# Patient Record
Sex: FEMALE | Race: WHITE
[De-identification: names, ages, dates, MRNs, and addresses within clinical notes are randomized per-mention and may not be internally consistent; named-entity substitution may affect disease eponyms.]

---

## 2017-08-18 ENCOUNTER — HOSPITAL ENCOUNTER (OUTPATIENT)
Dept: HOSPITAL 45 - C.LABMFLN | Age: 82
Discharge: HOME | End: 2017-08-18
Attending: FAMILY MEDICINE
Payer: COMMERCIAL

## 2017-08-18 DIAGNOSIS — J45.909: Primary | ICD-10-CM

## 2017-08-18 DIAGNOSIS — E55.9: ICD-10-CM

## 2017-08-18 DIAGNOSIS — E78.5: ICD-10-CM

## 2017-08-18 DIAGNOSIS — E03.9: ICD-10-CM

## 2017-08-18 LAB
ALBUMIN/GLOB SERPL: 1.1 {RATIO} (ref 0.9–2)
ALP SERPL-CCNC: 57 U/L (ref 45–117)
ALT SERPL-CCNC: 17 U/L (ref 12–78)
ANION GAP SERPL CALC-SCNC: 5 MMOL/L (ref 3–11)
AST SERPL-CCNC: 10 U/L (ref 15–37)
BASOPHILS # BLD: 0.02 K/UL (ref 0–0.2)
BASOPHILS NFR BLD: 0.4 %
BUN SERPL-MCNC: 21 MG/DL (ref 7–18)
BUN/CREAT SERPL: 26.5 (ref 10–20)
CALCIUM SERPL-MCNC: 8.5 MG/DL (ref 8.5–10.1)
CHLORIDE SERPL-SCNC: 104 MMOL/L (ref 98–107)
CHOLEST/HDLC SERPL: 2.6 {RATIO}
CO2 SERPL-SCNC: 27 MMOL/L (ref 21–32)
COMPLETE: YES
CREAT SERPL-MCNC: 0.78 MG/DL (ref 0.6–1.2)
EOSINOPHIL NFR BLD AUTO: 285 K/UL (ref 130–400)
GLOBULIN SER-MCNC: 3 GM/DL (ref 2.5–4)
GLUCOSE SERPL-MCNC: 73 MG/DL (ref 70–99)
GLUCOSE UR QL: 76 MG/DL
HCT VFR BLD CALC: 41.1 % (ref 37–47)
IG%: 0.2 %
IMM GRANULOCYTES NFR BLD AUTO: 29.6 %
KETONES UR QL STRIP: 103 MG/DL
LYMPHOCYTES # BLD: 1.58 K/UL (ref 1.2–3.4)
MCH RBC QN AUTO: 31.2 PG (ref 25–34)
MCHC RBC AUTO-ENTMCNC: 32.8 G/DL (ref 32–36)
MCV RBC AUTO: 94.9 FL (ref 80–100)
MONOCYTES NFR BLD: 12.2 %
NEUTROPHILS # BLD AUTO: 1.9 %
NEUTROPHILS NFR BLD AUTO: 55.7 %
NITRITE UR QL STRIP: 84 MG/DL (ref 0–150)
PH UR: 196 MG/DL (ref 0–200)
PMV BLD AUTO: 11 FL (ref 7.4–10.4)
POTASSIUM SERPL-SCNC: 3.8 MMOL/L (ref 3.5–5.1)
RBC # BLD AUTO: 4.33 M/UL (ref 4.2–5.4)
SODIUM SERPL-SCNC: 136 MMOL/L (ref 136–145)
VERY LOW DENSITY LIPOPROT CALC: 17 MG/DL
WBC # BLD AUTO: 5.33 K/UL (ref 4.8–10.8)

## 2017-10-20 ENCOUNTER — HOSPITAL ENCOUNTER (OUTPATIENT)
Dept: HOSPITAL 45 - C.CTS | Age: 82
Discharge: HOME | End: 2017-10-20
Attending: FAMILY MEDICINE
Payer: COMMERCIAL

## 2017-10-20 DIAGNOSIS — I65.09: Primary | ICD-10-CM

## 2017-10-20 NOTE — DIAGNOSTIC IMAGING REPORT
CT ANGIOGRAM OF THE BRAIN COMBO; CT ANGIOGRAM OF THE NECK



CLINICAL HISTORY: Vertebral artery stenosis.



COMPARISON STUDY:  No priors.



TECHNIQUE: Unenhanced axial CT scan of the brain is performed. Subsequently,

following the IV administration of 120 of Optiray 320, CT angiogram of the head

and neck was performed from the aortic arch to the vertex. Images are reviewed

in the axial, sagittal, and coronal planes. 3-D MIPS images are created and

assessed. IV contrast was administered without complication. All measurements

were calculated based on NASCET criteria.  A dose lowering technique was

utilized adhering to the principles of ALARA.



CT DOSE: 1110.73 mGy.cm



FINDINGS:



Brain parenchyma: There are age-related involutional changes noting mild

subcortical and periventricular microangiopathic disease. There is no

hemorrhage, mass effect, or evidence of acute territorial ischemia by CT

criteria. There is no evidence of enhancing mass lesion on the angiogram phase

images. The ventricles, sulci, and cisterns are prominent secondary to

involutional change. Gray-white matter differentiation is preserved. No

extra-axial fluid collection is seen.



Thoracic aorta: There is mild atherosclerotic calcification of the thoracic

aorta. Visualized portions of the thoracic aorta are normal in caliber. The 

arch demonstrates standard 3-vessel anatomy.



Right carotid arterial system: The right common carotid artery is widely patent,

as are the right internal and external carotid arteries. There is tortuosity of

the distal internal carotid artery.



Left carotid arterial system: The left common carotid artery is widely patent,

as are the left internal and external carotid arteries.



Vertebral arteries: Widely patent and codominant.



Subclavian arteries: Widely patent bilaterally.



Intracranial vasculature: There is mild atherosclerotic calcification of the

cavernous carotid arteries. There is a right posterior communicating artery. The

internal carotid arteries are patent at the skull base, as are the anterior and

middle cerebral arteries bilaterally. The vertebrobasilar system and posterior

cerebral arteries are widely patent. The vertebral arteries are codominant.

There is no aneurysm, high-grade stenosis, or focal vessel cut off seen

throughout the intracranial circulation.



Jugular veins: Widely patent bilaterally.



Dural sinuses: Patent.



Lung apices: Partially visualized upper lobe lung parenchyma appears clear.



Soft tissues: The visualized pharyngeal soft tissues are normal in appearance

noting angiographic phase technique. The oropharyngeal airway appears widely

patent. The salivary glands are normal in appearance. The thyroid gland is

atrophic. No cervical lymphadenopathy is seen.



Skeletal structures: The skeletal structures are osteopenic. The calvarium

appears maintained. The cervical spine is intact noting multilevel spondylosis.



Sinuses and mastoids: The paranasal sinuses are clear. The mastoid air cells are

well pneumatized.



Orbits: The bony orbits appear intact. There has been banding of the ocular

globes. Bilateral lens implants are noted.





IMPRESSION:



1. There is no hemorrhage, mass effect, or evidence of acute territorial

ischemia by CT criteria.



2. Unremarkable CT angiogram of the brain.



3. Unremarkable CT angiogram of the neck.







Electronically signed by:  Demetris Phipps M.D.

10/20/2017 1:48 PM



Dictated Date/Time:  10/20/2017 1:36 PM

## 2017-10-30 ENCOUNTER — HOSPITAL ENCOUNTER (OUTPATIENT)
Dept: HOSPITAL 45 - C.LABMFLN | Age: 82
Discharge: HOME | End: 2017-10-30
Attending: FAMILY MEDICINE
Payer: COMMERCIAL

## 2017-10-30 DIAGNOSIS — N39.0: Primary | ICD-10-CM

## 2017-11-10 ENCOUNTER — HOSPITAL ENCOUNTER (OUTPATIENT)
Dept: HOSPITAL 45 - C.LABMFLN | Age: 82
Discharge: HOME | End: 2017-11-10
Attending: FAMILY MEDICINE
Payer: COMMERCIAL

## 2017-11-10 DIAGNOSIS — N39.0: Primary | ICD-10-CM

## 2017-11-14 ENCOUNTER — HOSPITAL ENCOUNTER (OUTPATIENT)
Dept: HOSPITAL 45 - C.LABMFLN | Age: 82
Discharge: HOME | End: 2017-11-14
Attending: FAMILY MEDICINE
Payer: COMMERCIAL

## 2017-11-14 DIAGNOSIS — N39.0: Primary | ICD-10-CM

## 2018-01-11 ENCOUNTER — HOSPITAL ENCOUNTER (OUTPATIENT)
Dept: HOSPITAL 45 - C.LABSPEC | Age: 83
Discharge: HOME | End: 2018-01-11
Attending: UROLOGY
Payer: COMMERCIAL

## 2018-01-11 DIAGNOSIS — N39.0: Primary | ICD-10-CM

## 2018-02-15 LAB
ALBUMIN SERPL-MCNC: 3.7 GM/DL (ref 3.4–5)
BASOPHILS # BLD: 0.03 K/UL (ref 0–0.2)
BASOPHILS NFR BLD: 0.6 %
BUN SERPL-MCNC: 14 MG/DL (ref 7–18)
CALCIUM SERPL-MCNC: 9.5 MG/DL (ref 8.5–10.1)
CO2 SERPL-SCNC: 27 MMOL/L (ref 21–32)
CREAT SERPL-MCNC: 0.67 MG/DL (ref 0.6–1.2)
EOS ABS #: 0.15 K/UL (ref 0–0.5)
EOSINOPHIL NFR BLD AUTO: 254 K/UL (ref 130–400)
GLUCOSE SERPL-MCNC: 89 MG/DL (ref 70–99)
HBA1C MFR BLD: 5.6 % (ref 4.5–5.6)
HCT VFR BLD CALC: 40.8 % (ref 37–47)
HGB BLD-MCNC: 13.6 G/DL (ref 12–16)
IG#: 0 K/UL (ref 0–0.02)
IMM GRANULOCYTES NFR BLD AUTO: 22.4 %
INR PPP: 1 (ref 0.9–1.1)
LYMPHOCYTES # BLD: 1.09 K/UL (ref 1.2–3.4)
MCH RBC QN AUTO: 31 PG (ref 25–34)
MCHC RBC AUTO-ENTMCNC: 33.3 G/DL (ref 32–36)
MCV RBC AUTO: 92.9 FL (ref 80–100)
MONO ABS #: 0.43 K/UL (ref 0.11–0.59)
MONOCYTES NFR BLD: 8.8 %
NEUT ABS #: 3.17 K/UL (ref 1.4–6.5)
NEUTROPHILS # BLD AUTO: 3.1 %
NEUTROPHILS NFR BLD AUTO: 65.1 %
PMV BLD AUTO: 10.6 FL (ref 7.4–10.4)
POTASSIUM SERPL-SCNC: 3.8 MMOL/L (ref 3.5–5.1)
PTT PATIENT: 34.5 SECONDS (ref 21–31)
RED CELL DISTRIBUTION WIDTH CV: 13.3 % (ref 11.5–14.5)
RED CELL DISTRIBUTION WIDTH SD: 45.2 FL (ref 36.4–46.3)
SODIUM SERPL-SCNC: 137 MMOL/L (ref 136–145)
WBC # BLD AUTO: 4.87 K/UL (ref 4.8–10.8)

## 2018-02-15 NOTE — DIAGNOSTIC IMAGING REPORT
CHEST 2 VIEWS ROUTINE



CLINICAL HISTORY: pat preoperative evaluation



COMPARISON STUDY:  No previous studies for comparison.



FINDINGS: Mild cardiomegaly. External hernia. Diaphragms smooth. Lungs are

clear. 



IMPRESSION:  Mild cardiomegaly. Hilar hernia. No acute process. 











The above report was generated using voice recognition software.  It may contain

grammatical, syntax or spelling errors.









Electronically signed by:  Danish Humphreys M.D.

2/15/2018 4:46 PM



Dictated Date/Time:  2/15/2018 4:45 PM

## 2018-02-15 NOTE — HISTORY & PHYSICAL EXAMINATION
DATE OF ADMISSION:  03/05/2018

 

CHIEF COMPLAINT:  Left knee pain.

 

HISTORY OF PRESENT ILLNESS:  Ms. Vargas is an 88-year-old female with a 5-6

year history of left knee pain.  The patient rates her pain as 07/10.  She

has pain with her daily activities.  She has limited standing and walking

tolerance.  Pain is worse with weightbearing.  The patient has had injection.

 She ambulates with a walker.  She has failed conservative treatment and is

scheduled for left knee replacement.

 

PAST MEDICAL HISTORY:  Mitral valve regurgitation, asthma controlled, thyroid

disease.  She denies heart disease, diabetes or DVT.

 

PAST SURGICAL HISTORY:  Tonsillectomy, hysterectomy, ORIF of wrist and ORIF

of elbow.

 

SOCIAL HISTORY:  The patient denies alcohol or tobacco use.  She lives in a

2-robin home.  She is  and retired.

 

FAMILY HISTORY:  Negative for DVT.

 

MEDICATIONS:  Synthroid 75 mcg daily, simvastatin 10 mg daily, aspirin 325 mg

p.r.n., and Protonix 20 mg daily.

 

ALLERGIES:  CELEBREX, SULFA, CEPHALOSPORINS, LEVOFLOXACIN, CIPROFLOXACIN, AND

IODINE.

 

REVIEW OF SYSTEMS:  See HPI.  Ten other systems reviewed, all negative.

 

PHYSICAL EXAMINATION:

VITAL SIGNS:  Height 5 feet 3 inches, weight 140 pounds, BMI 25.

GENERAL:  This is a well-developed, well-nourished female who is alert and

oriented x3.  Mood and affect are appropriate.

HEENT:  Normocephalic, atraumatic.  Mucous membranes are moist and intact.

NECK:  Supple without lymphadenopathy.

HEART:  Regular rate and rhythm without murmurs, rubs or gallops.

LUNGS:  Clear to auscultation without wheezes or rhonchi.

ABDOMEN:  Soft and nontender.  Bowel sounds are equal and active.

EXTREMITIES:  No ecchymosis, redness or warmth.  She has a valgus deformity. 

Range of motion is from 5-110 degrees.  She has no laxity.  She is

neurovascularly intact with +5/5 strength.

 

X-RAY EXAMINATION:  AP and lateral views show joint space narrowing and

osteophyte formation.

 

IMPRESSION:  Degenerative joint disease, left knee.

 

PLAN:  The patient will be admitted for a left total knee arthroplasty.  We

will plan on aspirin for DVT prophylaxis.  The patient is planning a short

stay at [facility] upon discharge.

## 2018-02-15 NOTE — PAT MEDICATION INSTRUCTIONS
Service Date


Feb 15, 2018.





Current Home Medication List


Albuterol Sulfate (Proair Respiclick), 2 PUFFS INH PRN


Levothyroxine Sodium (Synthroid), 75 MCG PO QAM


Methenamine Hippurate (Methenamine Hippurate), 1 TAB PO BID


Multivitamin (Multivitamin), 1 TAB PO HS


Pantoprazole (Protonix), 20 MG PO QAM


Simvastatin (Zocor), 20 MG PO QPM





Medication Instructions


For Your Scheduled Surgery 





- Take the following medications the morning of surgery with a sip of water:


Albuterol Sulfate (Proair Respiclick), 2 PUFFS INH PRN (if needed, and bring it 

with you to the hospital)


Levothyroxine Sodium (Synthroid), 75 MCG PO QAM


Methenamine Hippurate (Methenamine Hippurate), 1 TAB PO BID


Pantoprazole (Protonix), 20 MG PO QAM








- Take the following medications as scheduled the night before surgery:


Albuterol Sulfate (Proair Respiclick), 2 PUFFS INH PRN (if needed)


Methenamine Hippurate (Methenamine Hippurate), 1 TAB PO BID


Multivitamin (Multivitamin), 1 TAB PO HS


Simvastatin (Zocor), 20 MG PO QPM








If you have any questions please call us at [phone] or [phone] or 

[phone]

## 2018-03-05 ENCOUNTER — HOSPITAL ENCOUNTER (INPATIENT)
Dept: HOSPITAL 45 - C.ACU | Age: 83
LOS: 3 days | Discharge: NURSING, REHAB OR LONG-TERM CARE | DRG: 470 | End: 2018-03-08
Payer: COMMERCIAL

## 2018-03-05 VITALS
HEART RATE: 68 BPM | DIASTOLIC BLOOD PRESSURE: 59 MMHG | SYSTOLIC BLOOD PRESSURE: 102 MMHG | TEMPERATURE: 97.34 F | OXYGEN SATURATION: 99 %

## 2018-03-05 VITALS
SYSTOLIC BLOOD PRESSURE: 130 MMHG | OXYGEN SATURATION: 97 % | TEMPERATURE: 97.52 F | HEART RATE: 76 BPM | DIASTOLIC BLOOD PRESSURE: 66 MMHG

## 2018-03-05 VITALS
DIASTOLIC BLOOD PRESSURE: 75 MMHG | HEART RATE: 75 BPM | TEMPERATURE: 97.88 F | OXYGEN SATURATION: 95 % | SYSTOLIC BLOOD PRESSURE: 126 MMHG

## 2018-03-05 VITALS
OXYGEN SATURATION: 97 % | SYSTOLIC BLOOD PRESSURE: 132 MMHG | HEART RATE: 65 BPM | TEMPERATURE: 97.7 F | DIASTOLIC BLOOD PRESSURE: 68 MMHG

## 2018-03-05 VITALS
OXYGEN SATURATION: 97 % | HEART RATE: 68 BPM | TEMPERATURE: 97.88 F | DIASTOLIC BLOOD PRESSURE: 68 MMHG | SYSTOLIC BLOOD PRESSURE: 118 MMHG

## 2018-03-05 VITALS
OXYGEN SATURATION: 99 % | TEMPERATURE: 97.88 F | SYSTOLIC BLOOD PRESSURE: 120 MMHG | DIASTOLIC BLOOD PRESSURE: 75 MMHG | HEART RATE: 68 BPM

## 2018-03-05 VITALS
DIASTOLIC BLOOD PRESSURE: 67 MMHG | OXYGEN SATURATION: 95 % | SYSTOLIC BLOOD PRESSURE: 120 MMHG | TEMPERATURE: 97.52 F | HEART RATE: 67 BPM

## 2018-03-05 VITALS
HEIGHT: 63 IN | BODY MASS INDEX: 24.88 KG/M2 | WEIGHT: 140.43 LBS | BODY MASS INDEX: 24.88 KG/M2 | HEIGHT: 63 IN | WEIGHT: 140.43 LBS

## 2018-03-05 VITALS
DIASTOLIC BLOOD PRESSURE: 68 MMHG | TEMPERATURE: 97.7 F | OXYGEN SATURATION: 99 % | HEART RATE: 65 BPM | SYSTOLIC BLOOD PRESSURE: 119 MMHG

## 2018-03-05 DIAGNOSIS — Z88.8: ICD-10-CM

## 2018-03-05 DIAGNOSIS — Z79.899: ICD-10-CM

## 2018-03-05 DIAGNOSIS — Z79.82: ICD-10-CM

## 2018-03-05 DIAGNOSIS — Z88.1: ICD-10-CM

## 2018-03-05 DIAGNOSIS — E07.9: ICD-10-CM

## 2018-03-05 DIAGNOSIS — M17.12: Primary | ICD-10-CM

## 2018-03-05 DIAGNOSIS — Z88.6: ICD-10-CM

## 2018-03-05 DIAGNOSIS — J45.909: ICD-10-CM

## 2018-03-05 DIAGNOSIS — Z88.2: ICD-10-CM

## 2018-03-05 PROCEDURE — 0SRD0J9 REPLACEMENT OF LEFT KNEE JOINT WITH SYNTHETIC SUBSTITUTE, CEMENTED, OPEN APPROACH: ICD-10-PCS

## 2018-03-05 RX ADMIN — METHENAMINE HIPPURATE SCH GM: 1 TABLET ORAL at 21:01

## 2018-03-05 RX ADMIN — DEXTROSE MONOHYDRATE, SODIUM CHLORIDE, AND POTASSIUM CHLORIDE SCH MLS/HR: 50; 4.5; 1.49 INJECTION, SOLUTION INTRAVENOUS at 13:30

## 2018-03-05 RX ADMIN — SIMVASTATIN SCH MG: 20 TABLET, FILM COATED ORAL at 22:25

## 2018-03-05 RX ADMIN — DOCUSATE SODIUM SCH MG: 100 CAPSULE, LIQUID FILLED ORAL at 21:01

## 2018-03-05 RX ADMIN — STANDARDIZED SENNA CONCENTRATE SCH MG: 8.6 TABLET ORAL at 21:00

## 2018-03-05 RX ADMIN — TRANEXAMIC ACID SCH MLS/HR: 100 INJECTION, SOLUTION INTRAVENOUS at 09:29

## 2018-03-05 RX ADMIN — Medication SCH MG: at 21:02

## 2018-03-05 RX ADMIN — FERROUS GLUCONATE SCH MG: 324 TABLET ORAL at 17:49

## 2018-03-05 RX ADMIN — ACETAMINOPHEN SCH MG: 500 TABLET, COATED ORAL at 16:51

## 2018-03-05 RX ADMIN — TRANEXAMIC ACID SCH MLS/HR: 100 INJECTION, SOLUTION INTRAVENOUS at 13:31

## 2018-03-05 RX ADMIN — CLINDAMYCIN PHOSPHATE SCH MLS/HR: 150 INJECTION, SOLUTION INTRAMUSCULAR; INTRAVENOUS at 17:49

## 2018-03-05 NOTE — OPERATIVE REPORT
DATE OF OPERATION:  03/05/2018

 

PREOPERATIVE DIAGNOSIS:  Osteoarthritis, left knee.

 

POSTOPERATIVE DIAGNOSIS:  Osteoarthritis, left knee.

 

PROCEDURE:  Left total knee arthroplasty.

 

SURGEON:  Dr. Storey.

 

ASSISTANT:  YUKO Reddy

 

ANESTHESIA:  Spinal.

 

COMPLICATIONS:  None.

 

IMPLANTS USED:  Femoral size 3, tibial size 3, tibial poly 13, and patella

size 36.

 

OPERATION AND FINDINGS:

 

Following induction of spinal anesthesia, the patient's left leg was prepped

and draped in the usual sterile manner.  Limb was exsanguinated with an

Esmarch bandage and tourniquet was inflated to 350 mmHg.  A longitudinal

incision was made anteriorly.  Subcutaneous tissue was sharply dissected. 

Electrocautery was used for hemostasis.  Prepatellar bursa was incised and

median parapatellar incision was performed.  Patella was everted and the knee

was flexed.  Fat pad was removed to aid in visualization and the anterior and

posterior cruciate ligaments were removed.  The medial face of the tibia was

cleared of soft tissue first with a Bovie and a Stephenson elevator.  This tissue

was retracted posteriorly using a blunt Hohmann.  A Durand retractor was

used to expose the synovium above on the anterior aspect of the femur and

this was removed down to bone.  The PSI guide was placed on the distal femur

and two pins were placed anteriorly and kept in position and two additional

pins were placed distally and removed.  The distal femoral cutting block was

placed in position and the distal femoral cut was used in the +0 setting.

 

Next, the cutting block was removed and the femoral 3 block was placed in the

distal end of the femur.  Care was taken to ensure appropriate external

rotation and feeler gauge was used to ensure no notching would occur.  The

femoral block was centered on the distal femur and in the medial and lateral

direction and was fixed using two bone screws.  The gold pins were then

removed.  The oscillating saw was used to create the bone cuts and the distal

femoral cutting block was removed and the reciprocating saw was used to

further trim the femoral cuts as well as a deep in the area for the trochlear

groove.  Next, posterior condyle remnants were removed.  Following this, a

meniscal clamp and knife were utilized to remove the anterior portion of both

medial and lateral meniscus.  The proximal tibia PSI guide was placed into

position and the proximal tibial cutting guide was screwed into position. 

The extra medullary alignment guide was utilized to ensure appropriate

alignment.  The proximal tibia was cut and the proximal tibial cutting block

was removed and this bone fragment was removed.  The appropriate guide was

used to perform the notch cut on the distal femur and a lamina  and a

cochlear knife were utilized to finish both medial and lateral meniscectomies

to remove any remnants of the posterior or anterior cruciate ligaments. 

Following this, the distal femoral component was impacted into position and

blunt Eligio was used to sublux the tibia anteriorly.  The proximal tibia was

sized and a 3 tibial tray was chosen as the size to be used.  This was put

into position and appropriate external rotation and a double check with

extramedullary alignment guide was performed.  The canal for the tibial stem

was prepared first with a 17 mm drill and then the punch and a mallet and the

trial tibial poly was placed.  A 13 was chosen the size to be used.  It was

brought to extension and the patella was prepared with the patellar reamer. 

A 36 component was chosen the size to be used.  The trial component was

placed and knee was taken through a full range of motion and there was found

to be no lateral subluxation of the tibia.  No lateral release was required. 

The trials were all removed.  The final components were obtained and

assembled.  Cement was mixed.  The knee was thoroughly irrigated and the

ortho mix was injected about the knee joint.  The final components were

cemented into position.  After thoroughly suctioning and drying the bone

ends, all excess cement was removed.  The knee was held in extension while

the cement hardened.  The wound was irrigated and closed over a Hemovac

drain.  #1 Vicryl was used to close the extensor mechanism.  Subcutaneous

tissues closed using 0 Dexon.  Skin was closed with staples.  Sterile

dressing of Adaptic, 4 x 4's, sterile Webril, and Ace was applied.  The

patient tolerated the procedure well.

 

Due to the complex nature of the procedure, the entire surgery was performed

with the operational assistance of YUKO Reddy.  The assistant, under

direct supervision, was involved in the actual performance of all aspects of

the surgical procedure including hemostasis, tissue retraction and incision,

instrument management, patient positioning, and wound closure.

 

DISPOSITION:  Recovery room, stable.

 

 

I attest to the content of the Intraoperative Record and any orders documented therein. Any exception
s are noted below.

## 2018-03-05 NOTE — DIAGNOSTIC IMAGING REPORT
L KNEE 1 OR 2 VIEWS ROUTINE



HISTORY:  88 years-old Female AP/LATERAL IN PACU LEFT KNEE status post left knee

total joint arthroplasty. Degenerative joint disease.



COMPARISON: None available



TECHNIQUE: 2 views of the left knee



FINDINGS: 

Postoperative changes from recent left knee total joint arthroplasty and

patellar resurfacing. Anterior midline skin staples are noted in addition to

expected postsurgical soft tissue swelling and deep tissue air. Surgical drain

is in place. The bones appear mildly demineralized. Alignment is satisfactory.

No retained foreign body identified.



IMPRESSION: Satisfactory alignment of the left knee status post placement of a

left knee total joint arthroplasty with patellar resurfacing. 







The above report was generated using voice recognition software. It may contain

grammatical, syntax or spelling errors.







Electronically signed by:  Cole Christianson M.D.

3/5/2018 11:46 AM



Dictated Date/Time:  3/5/2018 11:45 AM

## 2018-03-05 NOTE — ANESTHESIOLOGY PROGRESS NOTE
Anesthesia Post Op Note


Date & Time


Mar 5, 2018 at 12:21





Vital Signs


Pain Intensity:  0





Vital Signs Past 12 Hours








  Date Time  Temp Pulse Resp B/P (MAP) Pulse Ox O2 Delivery O2 Flow Rate FiO2


 


3/5/18 11:58 36.4   122/67    


 


3/5/18 11:56  63 14  100   


 


3/5/18 11:56  63 14     


 


3/5/18 11:53    128/61    


 


3/5/18 11:51  63 12  99   


 


3/5/18 11:51  63 12     


 


3/5/18 11:48    126/57    


 


3/5/18 11:46  62 18     


 


3/5/18 11:46  62 18  99   


 


3/5/18 11:45      Nasal Cannula 3 


 


3/5/18 11:43    125/58    


 


3/5/18 11:41  63 24  99   


 


3/5/18 11:41  63 24     


 


3/5/18 11:38    119/55    


 


3/5/18 11:36  65 12     


 


3/5/18 11:36  64 12  99   


 


3/5/18 11:33    119/51    


 


3/5/18 11:31  64 14  99   


 


3/5/18 11:31  64 14     


 


3/5/18 11:28    121/54    


 


3/5/18 11:26  69 16     


 


3/5/18 11:26  73 16  100   


 


3/5/18 11:23    119/52    


 


3/5/18 11:21  81 15  100   


 


3/5/18 11:21  69 15     


 


3/5/18 11:19    118/52    


 


3/5/18 11:16 36.5 76 16 118/52 100 Oxymask 7 


 


3/5/18 08:43 36.4 76 18 130/66 97 Room Air  











Notes


Mental Status:  alert / awake / arousable, participated in evaluation


Pt Amnestic to Procedure:  Yes


Nausea / Vomiting:  adequately controlled


Pain:  adequately controlled


Airway Patency, RR, SpO2:  stable & adequate


BP & HR:  stable & adequate


Hydration State:  stable & adequate


Neuraxial Anesthesia:  was administered, sensory block is resolving


Anesthetic Complications:  no major complications apparent

## 2018-03-05 NOTE — [facility] POST OPERATIVE BRIEF NOTE
Immediate Operative Summary


Operative Date


Mar 5, 2018.





Pre-Operative Diagnosis





Degenerative joint disease left knee





Post-Operative Diagnosis





Same as preoperative diagnosis





Procedure(s) Performed





Left total knee, Cemented, Gallo





Surgeon


Dr. Storey





Assistant Surgeon(s)


DEVAN Brantley PA-C





Estimated Blood Loss


10ml





Findings


Consistent with Post-Op Diagnosis





Specimens





A.  Left knee bone and tissue





Anesthesia Type


MAC Spinal Regional





Complication(s)


none





Disposition


Accompanied Pt To Recover:  no


Disposition:  Recovery Room / PACU

## 2018-03-06 VITALS
SYSTOLIC BLOOD PRESSURE: 132 MMHG | TEMPERATURE: 97.7 F | DIASTOLIC BLOOD PRESSURE: 71 MMHG | OXYGEN SATURATION: 95 % | HEART RATE: 67 BPM

## 2018-03-06 VITALS
HEART RATE: 68 BPM | DIASTOLIC BLOOD PRESSURE: 68 MMHG | OXYGEN SATURATION: 95 % | SYSTOLIC BLOOD PRESSURE: 121 MMHG | TEMPERATURE: 97.88 F

## 2018-03-06 VITALS
OXYGEN SATURATION: 95 % | HEART RATE: 68 BPM | DIASTOLIC BLOOD PRESSURE: 70 MMHG | SYSTOLIC BLOOD PRESSURE: 133 MMHG | TEMPERATURE: 97.7 F

## 2018-03-06 VITALS
OXYGEN SATURATION: 100 % | HEART RATE: 79 BPM | DIASTOLIC BLOOD PRESSURE: 74 MMHG | TEMPERATURE: 97.7 F | SYSTOLIC BLOOD PRESSURE: 125 MMHG

## 2018-03-06 VITALS — OXYGEN SATURATION: 99 % | HEART RATE: 72 BPM | SYSTOLIC BLOOD PRESSURE: 126 MMHG | DIASTOLIC BLOOD PRESSURE: 67 MMHG

## 2018-03-06 VITALS
SYSTOLIC BLOOD PRESSURE: 152 MMHG | HEART RATE: 79 BPM | OXYGEN SATURATION: 97 % | DIASTOLIC BLOOD PRESSURE: 76 MMHG | TEMPERATURE: 97.88 F

## 2018-03-06 LAB
BUN SERPL-MCNC: 23 MG/DL (ref 7–18)
CALCIUM SERPL-MCNC: 7.9 MG/DL (ref 8.5–10.1)
CO2 SERPL-SCNC: 25 MMOL/L (ref 21–32)
CREAT SERPL-MCNC: 0.98 MG/DL (ref 0.6–1.2)
EOSINOPHIL NFR BLD AUTO: 211 K/UL (ref 130–400)
GLUCOSE SERPL-MCNC: 115 MG/DL (ref 70–99)
HCT VFR BLD CALC: 35.9 % (ref 37–47)
HGB BLD-MCNC: 11.7 G/DL (ref 12–16)
MCH RBC QN AUTO: 29.9 PG (ref 25–34)
MCHC RBC AUTO-ENTMCNC: 32.6 G/DL (ref 32–36)
MCV RBC AUTO: 91.8 FL (ref 80–100)
PMV BLD AUTO: 10.6 FL (ref 7.4–10.4)
POTASSIUM SERPL-SCNC: 4.4 MMOL/L (ref 3.5–5.1)
RED CELL DISTRIBUTION WIDTH CV: 13.1 % (ref 11.5–14.5)
RED CELL DISTRIBUTION WIDTH SD: 44.1 FL (ref 36.4–46.3)
SODIUM SERPL-SCNC: 136 MMOL/L (ref 136–145)
WBC # BLD AUTO: 10.35 K/UL (ref 4.8–10.8)

## 2018-03-06 RX ADMIN — PANTOPRAZOLE SCH MG: 40 TABLET, DELAYED RELEASE ORAL at 07:00

## 2018-03-06 RX ADMIN — ACETAMINOPHEN SCH MG: 500 TABLET, COATED ORAL at 08:52

## 2018-03-06 RX ADMIN — LEVOTHYROXINE SODIUM SCH MCG: 75 TABLET ORAL at 05:27

## 2018-03-06 RX ADMIN — Medication SCH MG: at 20:41

## 2018-03-06 RX ADMIN — METHENAMINE HIPPURATE SCH GM: 1 TABLET ORAL at 08:52

## 2018-03-06 RX ADMIN — FERROUS GLUCONATE SCH MG: 324 TABLET ORAL at 08:49

## 2018-03-06 RX ADMIN — DOCUSATE SODIUM SCH MG: 100 CAPSULE, LIQUID FILLED ORAL at 20:41

## 2018-03-06 RX ADMIN — OXYCODONE HYDROCHLORIDE PRN MG: 5 TABLET ORAL at 23:06

## 2018-03-06 RX ADMIN — Medication SCH TAB: at 08:48

## 2018-03-06 RX ADMIN — FERROUS GLUCONATE SCH MG: 324 TABLET ORAL at 17:47

## 2018-03-06 RX ADMIN — DEXTROSE MONOHYDRATE, SODIUM CHLORIDE, AND POTASSIUM CHLORIDE SCH MLS/HR: 50; 4.5; 1.49 INJECTION, SOLUTION INTRAVENOUS at 01:31

## 2018-03-06 RX ADMIN — SIMVASTATIN SCH MG: 20 TABLET, FILM COATED ORAL at 20:41

## 2018-03-06 RX ADMIN — METHENAMINE HIPPURATE SCH GM: 1 TABLET ORAL at 20:41

## 2018-03-06 RX ADMIN — FERROUS GLUCONATE SCH MG: 324 TABLET ORAL at 12:49

## 2018-03-06 RX ADMIN — Medication SCH MG: at 08:48

## 2018-03-06 RX ADMIN — ACETAMINOPHEN SCH MG: 500 TABLET, COATED ORAL at 01:30

## 2018-03-06 RX ADMIN — DOCUSATE SODIUM SCH MG: 100 CAPSULE, LIQUID FILLED ORAL at 08:49

## 2018-03-06 RX ADMIN — OXYCODONE HYDROCHLORIDE PRN MG: 5 TABLET ORAL at 08:48

## 2018-03-06 RX ADMIN — STANDARDIZED SENNA CONCENTRATE SCH MG: 8.6 TABLET ORAL at 20:41

## 2018-03-06 RX ADMIN — ACETAMINOPHEN SCH MG: 500 TABLET, COATED ORAL at 16:44

## 2018-03-06 RX ADMIN — CLINDAMYCIN PHOSPHATE SCH MLS/HR: 150 INJECTION, SOLUTION INTRAMUSCULAR; INTRAVENOUS at 01:31

## 2018-03-06 NOTE — ORTHOPEDIC PROGRESS NOTE
Orthopedic Progress Note


Date of Service


Mar 6, 2018.





Subjective


Post OP Day:  1


Reports: feeling well





Objective


N/V intact, dressing C/D/I (Hemovac in place), toes mobile











  Date Time  Temp Pulse Resp B/P (MAP) Pulse Ox O2 Delivery O2 Flow Rate FiO2


 


3/6/18 03:15 36.6 79 16 152/76 (101) 97 Room Air  


 


3/5/18 23:30      Room Air  


 


3/5/18 22:50 36.5 65 18 132/68 (89) 97 Room Air  


 


3/5/18 19:54 36.6 75 16 126/75 (92) 95 Room Air  


 


3/5/18 15:25 36.6 68 16 120/75 (90) 99  3.0 


 


3/5/18 14:31 36.3 68 17 102/59 (73) 99 Nasal Cannula 3.0 


 


3/5/18 13:27 36.6 68 15 118/68 (85) 97 Nasal Cannula 3.0 


 


3/5/18 13:05 36.5 65 16 119/68 (85) 99 Nasal Cannula 2.0 


 


3/5/18 12:35      Nasal Cannula  


 


3/5/18 12:35      Nasal Cannula  


 


3/5/18 12:30 36.4 67 18 120/67 (84) 95 Nasal Cannula 3.0 


 


3/5/18 12:19  63 20     


 


3/5/18 12:19  63 20  100   


 


3/5/18 12:18    132/62    


 


3/5/18 12:14  63 17     


 


3/5/18 12:14  63 17  99   


 


3/5/18 12:13    124/61    


 


3/5/18 12:09  62 20  99   


 


3/5/18 12:09  63 20     


 


3/5/18 12:08    125/64    


 


3/5/18 12:04  61 17  100   


 


3/5/18 12:04  61 17     


 


3/5/18 12:03    133/61    


 


3/5/18 11:59  72 19  99   


 


3/5/18 11:59  71 19     


 


3/5/18 11:58 36.4   122/67    


 


3/5/18 11:56  63 14  100   


 


3/5/18 11:56  63 14     


 


3/5/18 11:53    128/61    


 


3/5/18 11:51  63 12  99   


 


3/5/18 11:51  63 12     


 


3/5/18 11:48    126/57    


 


3/5/18 11:46  62 18     


 


3/5/18 11:46  62 18  99   


 


3/5/18 11:45      Nasal Cannula 3 


 


3/5/18 11:43    125/58    


 


3/5/18 11:41  63 24  99   


 


3/5/18 11:41  63 24     


 


3/5/18 11:38    119/55    


 


3/5/18 11:36  65 12     


 


3/5/18 11:36  64 12  99   


 


3/5/18 11:33    119/51    


 


3/5/18 11:31  64 14  99   


 


3/5/18 11:31  64 14     


 


3/5/18 11:28    121/54    


 


3/5/18 11:26  69 16     


 


3/5/18 11:26  73 16  100   


 


3/5/18 11:23    119/52    


 


3/5/18 11:21  81 15  100   


 


3/5/18 11:21  69 15     


 


3/5/18 11:19    118/52    


 


3/5/18 11:16 36.5 76 16 118/52 100 Oxymask 7 


 


3/5/18 08:43 36.4 76 18 130/66 97 Room Air  








Laboratory Results 24 Hours:











Test


  3/6/18


06:11


 


Hematocrit 35.9 % 


 


Hemoglobin 11.7 g/dL 











Assessment & Plan


Assessment:


89 yo female stable POD #1 s/p left TKA


Plan:


1.  Med management


2.  DVT prophylaxis- ASA, SCDs


3.  PT/OT


4.  D/C planning- pt interested in [facility]

## 2018-03-06 NOTE — ANESTHESIOLOGY PROGRESS NOTE
Anesthesia Post Op Note


Date & Time


Mar 6, 2018 at 07:57





Vital Signs


Pain Intensity:  0.0





Vital Signs Past 12 Hours








  Date Time  Temp Pulse Resp B/P (MAP) Pulse Ox O2 Delivery O2 Flow Rate FiO2


 


3/6/18 07:17      Room Air  


 


3/6/18 07:08 36.5 67 16 132/71 (91) 95 Room Air  


 


3/6/18 03:15 36.6 79 16 152/76 (101) 97 Room Air  


 


3/5/18 23:30      Room Air  


 


3/5/18 22:50 36.5 65 18 132/68 (89) 97 Room Air  











Notes


Mental Status:  alert / awake / arousable, participated in evaluation


Pt Amnestic to Procedure:  Yes


Nausea / Vomiting:  adequately controlled


Pain:  adequately controlled


Airway Patency, RR, SpO2:  stable & adequate


BP & HR:  stable & adequate


Hydration State:  stable & adequate


Neuraxial Anesthesia:  was administered, sensory block resolved


Anesthetic Complications:  no major complications apparent

## 2018-03-07 VITALS
DIASTOLIC BLOOD PRESSURE: 74 MMHG | SYSTOLIC BLOOD PRESSURE: 126 MMHG | HEART RATE: 66 BPM | TEMPERATURE: 97.88 F | OXYGEN SATURATION: 100 %

## 2018-03-07 VITALS — OXYGEN SATURATION: 96 %

## 2018-03-07 VITALS
DIASTOLIC BLOOD PRESSURE: 69 MMHG | TEMPERATURE: 98.06 F | OXYGEN SATURATION: 96 % | SYSTOLIC BLOOD PRESSURE: 135 MMHG | HEART RATE: 77 BPM

## 2018-03-07 VITALS
OXYGEN SATURATION: 96 % | TEMPERATURE: 98.42 F | DIASTOLIC BLOOD PRESSURE: 74 MMHG | SYSTOLIC BLOOD PRESSURE: 133 MMHG | HEART RATE: 82 BPM

## 2018-03-07 VITALS — HEART RATE: 77 BPM

## 2018-03-07 VITALS
SYSTOLIC BLOOD PRESSURE: 146 MMHG | HEART RATE: 80 BPM | OXYGEN SATURATION: 98 % | TEMPERATURE: 98.24 F | DIASTOLIC BLOOD PRESSURE: 80 MMHG

## 2018-03-07 RX ADMIN — LEVOTHYROXINE SODIUM SCH MCG: 75 TABLET ORAL at 05:36

## 2018-03-07 RX ADMIN — PANTOPRAZOLE SCH MG: 40 TABLET, DELAYED RELEASE ORAL at 07:23

## 2018-03-07 RX ADMIN — METHENAMINE HIPPURATE SCH GM: 1 TABLET ORAL at 21:05

## 2018-03-07 RX ADMIN — DOCUSATE SODIUM SCH MG: 100 CAPSULE, LIQUID FILLED ORAL at 07:23

## 2018-03-07 RX ADMIN — Medication SCH MG: at 07:24

## 2018-03-07 RX ADMIN — FERROUS GLUCONATE SCH MG: 324 TABLET ORAL at 12:02

## 2018-03-07 RX ADMIN — Medication SCH MG: at 21:05

## 2018-03-07 RX ADMIN — Medication SCH TAB: at 07:23

## 2018-03-07 RX ADMIN — FERROUS GLUCONATE SCH MG: 324 TABLET ORAL at 07:24

## 2018-03-07 RX ADMIN — METHENAMINE HIPPURATE SCH GM: 1 TABLET ORAL at 07:24

## 2018-03-07 RX ADMIN — STANDARDIZED SENNA CONCENTRATE SCH MG: 8.6 TABLET ORAL at 21:06

## 2018-03-07 RX ADMIN — ACETAMINOPHEN SCH MG: 500 TABLET, COATED ORAL at 17:01

## 2018-03-07 RX ADMIN — ACETAMINOPHEN SCH MG: 500 TABLET, COATED ORAL at 01:40

## 2018-03-07 RX ADMIN — OXYCODONE HYDROCHLORIDE PRN MG: 5 TABLET ORAL at 07:23

## 2018-03-07 RX ADMIN — FERROUS GLUCONATE SCH MG: 324 TABLET ORAL at 17:01

## 2018-03-07 RX ADMIN — SIMVASTATIN SCH MG: 20 TABLET, FILM COATED ORAL at 21:05

## 2018-03-07 RX ADMIN — ACETAMINOPHEN SCH MG: 500 TABLET, COATED ORAL at 01:00

## 2018-03-07 RX ADMIN — DOCUSATE SODIUM SCH MG: 100 CAPSULE, LIQUID FILLED ORAL at 21:05

## 2018-03-07 RX ADMIN — ACETAMINOPHEN SCH MG: 500 TABLET, COATED ORAL at 07:25

## 2018-03-07 RX ADMIN — OXYCODONE HYDROCHLORIDE PRN MG: 5 TABLET ORAL at 15:49

## 2018-03-07 NOTE — ORTHOPEDIC PROGRESS NOTE
Orthopedic Progress Note


Date of Service


Mar 7, 2018.





Subjective


Post OP Day:  2


Reports: feeling well





Objective


calves soft nontender, N/V intact, incision C/D/I, toes mobile











  Date Time  Temp Pulse Resp B/P (MAP) Pulse Ox O2 Delivery O2 Flow Rate FiO2


 


3/7/18 06:04 36.7 77 18 135/69 (91) 96 Room Air  


 


3/6/18 23:05      Room Air  


 


3/6/18 22:57 36.5 68 18 133/70 (91) 95 Room Air  


 


3/6/18 15:40      Room Air  


 


3/6/18 15:20 36.6 68 18 121/68 (85) 95 Room Air  


 


3/6/18 10:38 36.5 79 18 125/74 (91) 100 Room Air  


 


3/6/18 10:27  72   99   











Assessment & Plan


Assessment:


89 yo female stable POD #2 s/p left TKA


Plan:


1.  Med management


2.  DVT prophylaxis- ASA, SCDs


3.  PT/OT


4.  D/C planning- d/c to [facility] tomorrow

## 2018-03-07 NOTE — DISCHARGE INSTRUCTIONS
Discharge Instructions


Date of Service


Mar 7, 2018.





Admission


Reason for Admission:  Left Knee Osteoarthritis





Discharge


Discharge Diagnosis / Problem:  Left knee arthritis





Discharge Goals


Goal(s):  Decrease discomfort, Improve function





Activity Recommendations


Activity Limitations:  as noted below


Weightbearing Status:  Left weightbearing (as tolerated)





.





Instructions / Follow-Up


Instructions / Follow-Up


ACTIVITY RECOMMENDATIONS:





SELF CARE INSTRUCTIONS AFTER TOTAL KNEE REPLACEMENT





A.  You may need to continue a physical therapy program after discharge from 

the hospital.  There are several options available to you. 


      Your doctor will assist you in selecting the best one for you.





   1.  An out-patient facility 2 to 3 times a week for therapy or home therapy.


   2.  Continue working on all exercises taught to you in the hospital.  Your


                 goals should be to increase bending of your knee to 90 degrees 

and


                 beyond and to fully straighten your knee.





B.  You may progress at your own pace from walking with a walker or crutches to 

a cane; then to no assistive devices.





C.   Make walking a part of your daily routine.  Be up as much as comfortable 

with rest periods throughout the day.  


      Rest with leg elevation is very important. 


      Use the ice wrap frequently for the first 3-4 weeks.





D.  There are no restrictions on activities.  You may ride in a car, shop, 

participate in household chores and all social activities.





E.  Wear the long elastic stockings (ALLISON hose) 20 hours a day for 2 weeks after 

surgery.  


     They can be removed several times a day for laundering and for a bath.





F.  You may shower, no tub baths until cleared by your doctor.








SPECIAL CARE INSTRUCTIONS:





**VERY IMPORTANT TO READ AND REVIEW**





A.  There are a few signs you need to watch for after you are home.  Call  

[facility] if you notice any of the followin.  Increased severe knee pain.  Some pain is expected especially  when you 

exercise.


   2.  Increased swelling in your leg or knee; pain or swelling of the calf 

muscle in either lower leg.


   3.  Any fluid drainage from the incision.


   4.  Shortness of breath or chest pain.





B.  Please call [facility] at [phone] if you have any  

concerns or questions about your operation or recovery.  


     The doctor or his nurse will return your call promptly.





C.  You must take antibiotics before dental work, bladder, bowel or other 

surgery.  


      Your doctor will provide you with a permanent care to carry describing 

this precaution.





IMPORTANT:





*  REMEMBER TO TAKE ASPIRIN, 81 MG, TWICE DAILY FOR 4 WEEKS UNLESS OTHERWISE 

DIRECTED.  


   THIS IS YOUR BLOOD THINNER.





*  HIGH RISK PATIENTS MAY BE PRESCRIBED A STRONGER BLOOD THINNER.  


   THIS WILL BE PROVIDED AT DISCHARGE.





*  CALL IF INCREASED PAIN, REDNESS, DRAINAGE OR FEVER GREATER THAT 101.





*  WEAR ALLISON HOSE 20 HOURS PER DAY FOR 2 WEEKS.





*  Maintain Zipline closure until follow-up with MD








FOLLOW UP VISIT:





If appointment is not already scheduled:





Please call [facility] to make a follow-up appointment for 


2 weeks after your surgery at [phone].





Current Hospital Diet


Patient's current hospital diet: Regular Diet





Discharge Diet


Recommended Diet:  Regular Diet





Procedures


Procedures Performed:  


Left total knee, Cemented, Gallo





Pending Studies


Studies pending at discharge:  no





Laboratory Results





Hemoglobin A1c








Test


  2/15/18


16:07 Range/Units


 


 


Estimated Average Glucose 114   mg/dl


 


Hemoglobin A1c 5.6  4.5-5.6  %











Medical Emergencies








.


Who to Call and When:





Medical Emergencies:  If at any time you feel your situation is an emergency, 

please call 911 immediately.





.





Non-Emergent Contact


Non-Emergency issues call your:  Surgeon


Call Non-Emergent contact if:  temperature is above 101.5, your pain is not 

controlled, wound has increased drainage, wound has increased redness


.








"Provider Documentation" section prepared by Ángel Rust PA-C.








.





[place] Drug Monitoring Program


Search Results:  patient reviewed within database, no issues identified

## 2018-03-08 VITALS
OXYGEN SATURATION: 90 % | SYSTOLIC BLOOD PRESSURE: 145 MMHG | TEMPERATURE: 98.42 F | DIASTOLIC BLOOD PRESSURE: 75 MMHG | HEART RATE: 66 BPM

## 2018-03-08 VITALS
OXYGEN SATURATION: 90 % | HEART RATE: 66 BPM | DIASTOLIC BLOOD PRESSURE: 75 MMHG | TEMPERATURE: 98.42 F | SYSTOLIC BLOOD PRESSURE: 145 MMHG

## 2018-03-08 RX ADMIN — Medication SCH MG: at 08:37

## 2018-03-08 RX ADMIN — Medication SCH TAB: at 08:37

## 2018-03-08 RX ADMIN — PANTOPRAZOLE SCH MG: 40 TABLET, DELAYED RELEASE ORAL at 08:38

## 2018-03-08 RX ADMIN — METHENAMINE HIPPURATE SCH GM: 1 TABLET ORAL at 08:37

## 2018-03-08 RX ADMIN — DOCUSATE SODIUM SCH MG: 100 CAPSULE, LIQUID FILLED ORAL at 08:38

## 2018-03-08 RX ADMIN — FERROUS GLUCONATE SCH MG: 324 TABLET ORAL at 08:37

## 2018-03-08 RX ADMIN — ACETAMINOPHEN SCH MG: 500 TABLET, COATED ORAL at 08:38

## 2018-03-08 RX ADMIN — LEVOTHYROXINE SODIUM SCH MCG: 75 TABLET ORAL at 06:07

## 2018-03-08 RX ADMIN — ACETAMINOPHEN SCH MG: 500 TABLET, COATED ORAL at 00:26

## 2018-03-08 NOTE — ORTHOPEDIC PROGRESS NOTE
Orthopedic Progress Note


Date of Service


Mar 8, 2018.





Subjective


Post OP Day:  3


Reports: feeling well





Objective


calves soft nontender, N/V intact, incision C/D/I, toes mobile











  Date Time  Temp Pulse Resp B/P (MAP) Pulse Ox O2 Delivery O2 Flow Rate FiO2


 


3/7/18 22:53 36.9 82 17 133/74 (93) 96 Room Air  


 


3/7/18 20:30      Room Air  


 


3/7/18 15:18 36.8 80 16 146/80 (102) 98 Room Air  


 


3/7/18 12:50  77      


 


3/7/18 11:58 36.6 66 12 126/74 (91) 100 Room Air  


 


3/7/18 08:25     96 Room Air  


 


3/7/18 07:30      Room Air  











Assessment & Plan


Assessment:


87 yo female stable POD #3 s/p left TKA


Plan:


1.  Med management


2.  DVT prophylaxis- ASA, SCDs


3.  PT/OT


4.  D/C planning- d/c to [place]